# Patient Record
Sex: FEMALE
[De-identification: names, ages, dates, MRNs, and addresses within clinical notes are randomized per-mention and may not be internally consistent; named-entity substitution may affect disease eponyms.]

---

## 2022-01-12 PROBLEM — Z00.00 ENCOUNTER FOR PREVENTIVE HEALTH EXAMINATION: Status: ACTIVE | Noted: 2022-01-12

## 2022-01-13 ENCOUNTER — NON-APPOINTMENT (OUTPATIENT)
Age: 27
End: 2022-01-13

## 2022-01-13 ENCOUNTER — APPOINTMENT (OUTPATIENT)
Dept: ORTHOPEDIC SURGERY | Facility: CLINIC | Age: 27
End: 2022-01-13
Payer: MEDICAID

## 2022-01-13 DIAGNOSIS — S52.592A OTHER FRACTURES OF LOWER END OF LEFT RADIUS, INITIAL ENCOUNTER FOR CLOSED FRACTURE: ICD-10-CM

## 2022-01-13 PROCEDURE — 29075 APPL CST ELBW FNGR SHORT ARM: CPT | Mod: LT

## 2022-01-13 PROCEDURE — 73110 X-RAY EXAM OF WRIST: CPT | Mod: LT

## 2022-01-13 PROCEDURE — 99203 OFFICE O/P NEW LOW 30 MIN: CPT | Mod: 25

## 2022-01-13 NOTE — DISCUSSION/SUMMARY
[FreeTextEntry1] : The underlying pathophysiology was reviewed with the patient. XR films were reviewed with the patient. Discussed at length the nature of the patient’s condition. The left wrist symptoms appear secondary to hairline fracture of the distal radius.\par \par At this time, I am recommending cast immobilization due to the nature of the injury as documented above.\par A left short arm cast was placed on 1/13/22. She was advised that the cast will be in place for 6 weeks.\par Proper cast care instructions were reviewed.\par She was encouraged to utilize the hand while casted for all ADLs as tolerated by pain.\par She was instructed on ROM exercises of the shoulder, elbow and hand.\par \par All questions answered, understanding verbalized. Patient in agreement with plan of care. Follow up in 6 weeks for cast removal and repeat xrays. I did discuss with her that if she returns sooner after 4 or 5 weeks, if the xrays show the fracture to be healed and her pain has decreased, I may remove the cast early.

## 2022-01-13 NOTE — ADDENDUM
[FreeTextEntry1] : I, Yazmin Sloan wrote this note acting as a scribe for Dr. Christian Infante on Jan 13, 2022.

## 2022-01-13 NOTE — END OF VISIT
[FreeTextEntry3] : All medical record entries made by the Scribe were at my,  Dr. Christian Infante MD., direction and personally dictated by me on 01/13/2022. I have personally reviewed the chart and agree that the record accurately reflects my personal performance of the history, physical exam, assessment and plan.

## 2022-01-13 NOTE — HISTORY OF PRESENT ILLNESS
[Right] : right hand dominant [FreeTextEntry1] : Patient is a 26 year old female who presents with left wrist pain secondary to an injury which occurred on Friday 1/7/22 at which time she fell while snowboarding at [place]. Xrays were obtained on 1/7/22 at the clinic at [place].  She was told they were questionable for fracture. She has pain with flexion and extension of the wrist but not into rotation movements. No ecchymosis present.

## 2022-01-13 NOTE — PHYSICAL EXAM
[de-identified] : Patient is WDWN, alert, and in no acute distress. Breathing is unlabored. She is grossly oriented to person, place, and time.\par \par Left Hand:\par Tenderness present radially through the wrist.\par Full rotation of the wrist\par Limitations of flexion and extension of the wrist\par Full digital motion\par No bruising or discoloration present. \par Sensation is normal\par  [de-identified] : AP, lateral and oblique views of the LEFT wrist were obtained today and revealed a hairline fracture to the distal radius.\par \par ------------------------------------------------------------------------------------------------------------------------------------------------------------------------------------\par \par AP, lateral and oblique views of the LEFT wrist were obtained 1/7/22 and revealed no clear osseous abnormality.

## 2022-02-10 ENCOUNTER — APPOINTMENT (OUTPATIENT)
Dept: ORTHOPEDIC SURGERY | Facility: CLINIC | Age: 27
End: 2022-02-10
Payer: MEDICAID

## 2022-02-10 VITALS — BODY MASS INDEX: 21.69 KG/M2 | WEIGHT: 135 LBS | HEIGHT: 66 IN

## 2022-02-10 DIAGNOSIS — S52.592D OTHER FRACTURES OF LOWER END OF LEFT RADIUS, SUBSEQUENT ENCOUNTER FOR CLOSED FRACTURE WITH ROUTINE HEALING: ICD-10-CM

## 2022-02-10 PROCEDURE — 99213 OFFICE O/P EST LOW 20 MIN: CPT | Mod: 25

## 2022-02-10 PROCEDURE — 73110 X-RAY EXAM OF WRIST: CPT | Mod: LT

## 2022-02-10 PROCEDURE — 29125 APPL SHORT ARM SPLINT STATIC: CPT | Mod: LT

## 2022-02-10 NOTE — PHYSICAL EXAM
[de-identified] : Patient is WDWN, alert, and in no acute distress. Breathing is unlabored. She is grossly oriented to person, place, and time.\par \par Left Hand:\par Patient presents to the office in a left short arm cast which was removed today on 2/10/22\par Resolved tenderness radially through the wrist.\par Residual edema noted status post injury. \par Full rotation of the wrist\par Limitations of flexion and extension of the wrist status post casting for 4 weeks\par Full digital motion\par No bruising or discoloration present. \par Sensation is normal [de-identified] : AP, lateral and oblique views of the LEFT wrist were obtained today and revealed a hairline fracture to the distal radius. Fracture is not yet fully healed.

## 2022-02-10 NOTE — ADDENDUM
[FreeTextEntry1] : I, Yazmin Sloan wrote this note acting as a scribe for Dr. Christian Infante on Feb 10, 2022.

## 2022-02-10 NOTE — DISCUSSION/SUMMARY
[FreeTextEntry1] : The underlying pathophysiology was reviewed with the patient. XR films were reviewed with the patient. Discussed at length the nature of the patient’s condition. The left wrist symptoms appear secondary to hairline fracture of the distal radius.\par \par The left short arm cast was removed in office today on 2/10/22. \par She was placed into a well molded fiber glass splint for external support. \par She was advised to that she may return to working out at the gym but I would not recommend that she immediately return to WB activities such as push-ups and pull-ups. \par She was additionally instructed on ROM exercises of the wrist and digits.\par \par All questions answered, understanding verbalized. Patient in agreement with plan of care. Follow up in 4 weeks if needed.

## 2022-02-10 NOTE — END OF VISIT
[FreeTextEntry3] : All medical record entries made by the Scribe were at my,  Dr. Christian Infante MD., direction and personally dictated by me on 02/10/2022. I have personally reviewed the chart and agree that the record accurately reflects my personal performance of the history, physical exam, assessment and plan.

## 2022-02-10 NOTE — HISTORY OF PRESENT ILLNESS
[Right] : right hand dominant [FreeTextEntry1] : Patient is a 26 year old female who presents with left wrist pain secondary to an injury which occurred on Friday 1/7/22 at which time she fell while snowboarding at [place]. Xrays were obtained on 1/7/22 at the clinic at [place].  She was told they were questionable for fracture. She has pain with flexion and extension of the wrist but not into rotation movements. No ecchymosis present. She presented to the office on 1/13/22 at which time she was casted due to a hairline fracture of the distal radius. She returns on 2/10/22 for repeat xrays and cast removal. Overall she reports to be feeling much better than she did at which time she presented to the office.